# Patient Record
Sex: MALE | Race: WHITE | NOT HISPANIC OR LATINO | Employment: FULL TIME | URBAN - METROPOLITAN AREA
[De-identification: names, ages, dates, MRNs, and addresses within clinical notes are randomized per-mention and may not be internally consistent; named-entity substitution may affect disease eponyms.]

---

## 2018-03-24 ENCOUNTER — APPOINTMENT (OUTPATIENT)
Dept: GENERAL RADIOLOGY | Facility: HOSPITAL | Age: 63
End: 2018-03-24

## 2018-03-24 ENCOUNTER — HOSPITAL ENCOUNTER (EMERGENCY)
Facility: HOSPITAL | Age: 63
Discharge: HOME | End: 2018-03-24
Admitting: NURSE PRACTITIONER

## 2018-03-24 VITALS
HEART RATE: 83 BPM | OXYGEN SATURATION: 97 % | HEIGHT: 70 IN | WEIGHT: 315 LBS | TEMPERATURE: 98.1 F | DIASTOLIC BLOOD PRESSURE: 73 MMHG | BODY MASS INDEX: 45.1 KG/M2 | SYSTOLIC BLOOD PRESSURE: 157 MMHG | RESPIRATION RATE: 18 BRPM

## 2018-03-24 DIAGNOSIS — M25.552 LEFT HIP PAIN: Primary | ICD-10-CM

## 2018-03-24 DIAGNOSIS — M79.18 LEFT BUTTOCK PAIN: ICD-10-CM

## 2018-03-24 PROCEDURE — 73502 X-RAY EXAM HIP UNI 2-3 VIEWS: CPT

## 2018-03-24 PROCEDURE — 99282 EMERGENCY DEPT VISIT SF MDM: CPT

## 2018-03-24 PROCEDURE — 99282 EMERGENCY DEPT VISIT SF MDM: CPT | Performed by: NURSE PRACTITIONER

## 2018-03-24 RX ORDER — FINASTERIDE 5 MG/1
5 TABLET, FILM COATED ORAL DAILY
COMMUNITY

## 2018-03-25 NOTE — ED PROVIDER NOTES
Subjective   History of Present Illness  History of Present Illness    Chief complaint: pain    Location: left hip and buttock    Quality/Severity:  Minimal, rated as a 1    Timing/Duration: today     Modifying Factors: laying on left side     Associated Symptoms: denies shortness of air, denies chest pain    Narrative: 62 year old male presented to the ED today with his wife after slipping on ice and falling down three steps on his buttocks.  He has complaints of left sided hip and left buttock pain.  Denies hitting his head or loss of consciousness.  He reports taking over the counter pain medication prior to arrival and rated his pain as a 1 out of 10.  He is smiling in the room while talking.  Denies back pain, chest pain, abdominal pain, and no shortness of air.      Review of Systems  General: Denies fevers or chills.  Denies any weakness or fatigue.  Denies any weight loss or weight gain.  SKIN: Denies any rashes lesions or ulcers.  Denies color change.  ENT: Denies sore throat or rhinorrhea.  Denies ear pain.    EYES: Denies any blurred vision.  Denies any change in vision.  Denies any photophobia.  Denies any vision loss.  LUNGS: Denies any shortness of breath or wheezing.  Denies any cough.  Denies any hemoptysis.  CARDIAC: Denies any chest pain.  Denies palpitations.  Denies syncope.  Denies any edema  ABD: Denies any abdominal pain.  Denies any nausea or vomiting or diarrhea.  Denies any rectal bleeding.  Denies constipation  : Denies any dysuria, urgency, frequency or hematuria.  Denies discharge.  Denies flank pain.  NEURO: Denies any focal weakness.  Denies headache.  Denies seizures.  Denies changes in speech or difficulty walking.  ENDOCRINE: Denies polydipsia and polyuria  M/S: + left hip and left buttock pain, Denies arthralgias, back pain, myalgias or neck pain  HEME/LYMPH: Negative for adenopathy. Does not bruise/bleed easily.   PSYCH: Negative for suicidal ideas. Denies anxiety or  depression  review was performed in addition to those in the above all other reviews are negative.    Past Medical History:   Diagnosis Date   • Diabetes    • High cholesterol    • Hypertension        Allergies   Allergen Reactions   • Niacin        Past Surgical History:   Procedure Laterality Date   • BACK SURGERY     • HERNIA REPAIR     • KNEE SURGERY     • TONSILLECTOMY         Family History   Problem Relation Age of Onset   • Cancer Mother    • Diabetes Father    • Diabetes Maternal Grandfather        Social History     Social History   • Marital status:      Social History Main Topics   • Smoking status: Never Smoker   • Alcohol use No   • Drug use: Unknown     Other Topics Concern   • Not on file     No current facility-administered medications for this encounter.     Current Outpatient Prescriptions:   •  amitriptyline (ELAVIL) 25 MG tablet, TAKE (1) TABLET AT BEDTIME FOR NEUROPATHY, Disp: , Rfl: 11  •  atorvastatin (LIPITOR) 40 MG tablet, , Disp: , Rfl:   •  dutasteride (AVODART) 0.5 MG capsule, , Disp: , Rfl:   •  finasteride (PROSCAR) 5 MG tablet, Take 5 mg by mouth Daily., Disp: , Rfl:   •  gabapentin (NEURONTIN) 300 MG capsule, , Disp: , Rfl:   •  losartan (COZAAR) 100 MG tablet, TAKE ONE TABLET BY MOUTH EVERY MORNING FOR BLOOD PRESSURE, Disp: , Rfl: 1  •  pioglitazone (ACTOS) 30 MG tablet, TAKE 1 TABLET BY MOUTH ONCE DAILY., Disp: , Rfl: 2  •  BYDUREON 2 MG pen-injector, INJECT 1 SUBCUTANEOUS EVERY WEEK, Disp: , Rfl: 2  •  CLARITIN-D 24 HOUR  MG per 24 hr tablet, TAKE 1 TABLET EVERY DAY, Disp: , Rfl: 5  •  omeprazole (PriLOSEC) 20 MG capsule, TAKE ONE CAPSULE BY MOUTH TWICE DAILY, Disp: , Rfl: 0  •  oxyCODONE-acetaminophen (PERCOCET) 5-325 MG per tablet, TAKE 1-2 TABLETS BY MOUTH EVERY 4 HOURS AS NEEDED FOR PAIN, Disp: , Rfl: 0  •  pantoprazole (PROTONIX) 40 MG EC tablet, TAKE ONE TABLET TWICE DAILY, Disp: , Rfl: 0    Vitals:    03/24/18 1959   BP: 157/73   Pulse: 83   Resp: 18   Temp:  98.1 °F (36.7 °C)   SpO2: 97%           Objective   Physical Exam  General: NAD, alert and oriented x 4  Cardiac: S1, S2, RRR, no murmur, no edema  Pulmonary: CTA bilaterally, no wheezing   Abdomen: soft, non-tender, non-distended  : Voids independently, no CVA tenderness   Musculoskeletal: Moves all extremities, equal strength bilaterally  Neuro: alert and oriented x 3, CN 2 - 12 grossly intact  Skin: warm, dry, and intact.  Ecchymosis noted to left buttock.    Procedures         ED Course  ED Course      X-ray taken of left hip reviewed by myself and Dr. Woo.  No acute findings seen.  Follow up with PCP.  Return to ED if worsens.  Continue to take over the counter pain medication as needed.  Can use ice or heat for comfort.             MDM  Number of Diagnoses or Management Options  Left buttock pain: new and requires workup  Left hip pain: new and requires workup     Amount and/or Complexity of Data Reviewed  Tests in the radiology section of CPT®: reviewed    Risk of Complications, Morbidity, and/or Mortality  Presenting problems: moderate  Diagnostic procedures: moderate  Management options: low    Patient Progress  Patient progress: stable      Final diagnoses:   Left hip pain   Left buttock pain            Kary Schmidt, APRN  03/24/18 2122

## 2020-12-08 ENCOUNTER — TELEPHONE (OUTPATIENT)
Dept: GASTROENTEROLOGY | Facility: CLINIC | Age: 65
End: 2020-12-08

## 2020-12-09 NOTE — TELEPHONE ENCOUNTER
LAST COLONOSCOPY 08/31/2010 - NO HX OR FHX OF COLON CA OR POLYPS - HX OF CONSTIPATION - PLEASE SCHEDULE @ [place] - THANKS

## 2020-12-11 ENCOUNTER — PREP FOR SURGERY (OUTPATIENT)
Dept: OTHER | Facility: HOSPITAL | Age: 65
End: 2020-12-11

## 2020-12-11 DIAGNOSIS — Z12.11 ENCOUNTER FOR SCREENING FOR MALIGNANT NEOPLASM OF COLON: Primary | ICD-10-CM

## 2020-12-14 PROBLEM — Z12.11 ENCOUNTER FOR SCREENING FOR MALIGNANT NEOPLASM OF COLON: Status: ACTIVE | Noted: 2020-12-14

## 2020-12-14 NOTE — TELEPHONE ENCOUNTER
CALLED AND SPOKE WITH PATIENT.  SCHEDULED AT [place] 04/09/2021 AT 10:15AM - ARRIVE 9AM.   WILL MAIL INSTRUCTIONS.    COVID TEST ON 04/07/2021, WILL CALL WITH TIME.  NEED TO SELF QUARANTINE UNTIL AFTER PROCEDURE.  HE UNDERSTANDS.

## 2021-03-24 ENCOUNTER — TELEPHONE (OUTPATIENT)
Dept: GASTROENTEROLOGY | Facility: CLINIC | Age: 66
End: 2021-03-24

## 2021-03-24 NOTE — TELEPHONE ENCOUNTER
PATIENT CALL YESTERDAY, 03/23/2021 AT 3:17PM.  LEFT MESSAGE ON MY VOICE MAIL.  NEEDS TO CANCEL COLONOSCOPY ON 04/09/2021.

## 2021-03-24 NOTE — TELEPHONE ENCOUNTER
CALLED AND SPOKE WITH PATIENT.  HE DOES NOT WANT TO RESCHEDULE AT THIS TIME.  HE WILL CALL WHEN READY.

## 2021-04-07 ENCOUNTER — APPOINTMENT (OUTPATIENT)
Dept: LAB | Facility: HOSPITAL | Age: 66
End: 2021-04-07

## 2024-02-20 ENCOUNTER — HOSPITAL ENCOUNTER (EMERGENCY)
Facility: HOSPITAL | Age: 69
Discharge: HOME | End: 2024-02-20
Attending: EMERGENCY MEDICINE | Admitting: EMERGENCY MEDICINE
Payer: COMMERCIAL

## 2024-02-20 VITALS
HEART RATE: 71 BPM | RESPIRATION RATE: 16 BRPM | HEIGHT: 70 IN | BODY MASS INDEX: 45.1 KG/M2 | SYSTOLIC BLOOD PRESSURE: 143 MMHG | WEIGHT: 315 LBS | OXYGEN SATURATION: 97 % | DIASTOLIC BLOOD PRESSURE: 69 MMHG | TEMPERATURE: 97.7 F

## 2024-02-20 VITALS
TEMPERATURE: 97.5 F | DIASTOLIC BLOOD PRESSURE: 73 MMHG | SYSTOLIC BLOOD PRESSURE: 176 MMHG | WEIGHT: 315 LBS | BODY MASS INDEX: 45.1 KG/M2 | OXYGEN SATURATION: 96 % | HEIGHT: 70 IN | RESPIRATION RATE: 16 BRPM | HEART RATE: 70 BPM

## 2024-02-20 DIAGNOSIS — S01.312A LACERATION OF LEFT EARLOBE, INITIAL ENCOUNTER: Primary | ICD-10-CM

## 2024-02-20 DIAGNOSIS — S01.312A LACERATION OF LEFT EAR, INITIAL ENCOUNTER: Primary | ICD-10-CM

## 2024-02-20 PROCEDURE — 99282 EMERGENCY DEPT VISIT SF MDM: CPT

## 2024-02-20 PROCEDURE — 90715 TDAP VACCINE 7 YRS/> IM: CPT | Performed by: EMERGENCY MEDICINE

## 2024-02-20 PROCEDURE — 25010000002 TETANUS-DIPHTH-ACELL PERTUSSIS 5-2.5-18.5 LF-MCG/0.5 SUSPENSION PREFILLED SYRINGE: Performed by: EMERGENCY MEDICINE

## 2024-02-20 PROCEDURE — 99283 EMERGENCY DEPT VISIT LOW MDM: CPT

## 2024-02-20 PROCEDURE — 90471 IMMUNIZATION ADMIN: CPT | Performed by: EMERGENCY MEDICINE

## 2024-02-20 RX ORDER — CEPHALEXIN 500 MG/1
500 CAPSULE ORAL 3 TIMES DAILY
Qty: 15 CAPSULE | Refills: 0 | Status: SHIPPED | OUTPATIENT
Start: 2024-02-20

## 2024-02-20 RX ORDER — CEPHALEXIN 500 MG/1
500 CAPSULE ORAL ONCE
Status: COMPLETED | OUTPATIENT
Start: 2024-02-20 | End: 2024-02-20

## 2024-02-20 RX ORDER — LIDOCAINE HYDROCHLORIDE 20 MG/ML
10 INJECTION, SOLUTION INFILTRATION; PERINEURAL ONCE
Status: COMPLETED | OUTPATIENT
Start: 2024-02-20 | End: 2024-02-20

## 2024-02-20 RX ADMIN — CEPHALEXIN 500 MG: 500 CAPSULE ORAL at 05:40

## 2024-02-20 RX ADMIN — LIDOCAINE HYDROCHLORIDE 10 ML: 20 INJECTION, SOLUTION INFILTRATION; PERINEURAL at 09:42

## 2024-02-20 RX ADMIN — TETANUS TOXOID, REDUCED DIPHTHERIA TOXOID AND ACELLULAR PERTUSSIS VACCINE, ADSORBED 0.5 ML: 5; 2.5; 8; 8; 2.5 SUSPENSION INTRAMUSCULAR at 05:39

## 2024-02-20 NOTE — ED PROVIDER NOTES
Subjective   History of Present Illness  Patient presents emergency room complaining of laceration to his ear.  Patient fell out of bed last night onto his ear causing a laceration to his left ear.  Patient presented to this emergency room earlier this morning where he was seen and had Dermabond placed to his left ear.  Patient states that when he returned home he started having bleeding from the same laceration and came back to the emergency room.  Patient has no other complaints.  Tetanus is up-to-date.  Patient is not on blood thinners.      Review of Systems   Constitutional:  Negative for activity change, appetite change, chills, diaphoresis, fatigue and fever.   HENT:  Negative for congestion, sinus pressure, sneezing and sore throat.    Eyes:  Negative for photophobia and visual disturbance.   Respiratory:  Negative for cough and shortness of breath.    Cardiovascular:  Negative for chest pain, palpitations and leg swelling.   Gastrointestinal:  Negative for abdominal distention, abdominal pain, diarrhea, nausea and vomiting.   Genitourinary:  Negative for dysuria and flank pain.   Musculoskeletal:  Negative for arthralgias, back pain and myalgias.   Skin:  Positive for wound. Negative for rash.   Neurological:  Negative for dizziness, weakness and headaches.   Psychiatric/Behavioral:  Negative for behavioral problems and confusion.        Past Medical History:   Diagnosis Date    Diabetes     High cholesterol     Hypertension        Allergies   Allergen Reactions    Niacin        Past Surgical History:   Procedure Laterality Date    BACK SURGERY      HERNIA REPAIR      KNEE SURGERY      TONSILLECTOMY         Family History   Problem Relation Age of Onset    Cancer Mother     Diabetes Father     Diabetes Maternal Grandfather        Social History     Socioeconomic History    Marital status:    Tobacco Use    Smoking status: Never   Substance and Sexual Activity    Alcohol use: No           Objective    Physical Exam  Vitals and nursing note reviewed.   Constitutional:       General: He is not in acute distress.     Appearance: Normal appearance. He is not toxic-appearing or diaphoretic.   HENT:      Head: Normocephalic and atraumatic.      Ears:        Comments: Patient has a laceration running from posterior pinna along the line of attachment of his ear to his scalp in the area seen on the diagram but on the posterior surface.  Dermabond was used to close the laceration of the pinna.  However the area of laceration that involved the attachment of the ear to the scalp does not appear to be closed.  Whether this is from failure of the Dermabond or whether Dermabond was not used in this area is hard to discern.       Mouth/Throat:      Mouth: Mucous membranes are moist.   Eyes:      Conjunctiva/sclera: Conjunctivae normal.   Cardiovascular:      Rate and Rhythm: Normal rate.      Pulses: Normal pulses.   Pulmonary:      Effort: Pulmonary effort is normal. No respiratory distress.      Breath sounds: No wheezing or rales.   Musculoskeletal:         General: No swelling or signs of injury. Normal range of motion.      Cervical back: Normal range of motion and neck supple.   Skin:     General: Skin is warm and dry.      Findings: No rash.   Neurological:      General: No focal deficit present.      Mental Status: He is alert.         Laceration Repair    Date/Time: 2/20/2024 9:35 AM    Performed by: Aureliano Coombs MD  Authorized by: Aureliano Coombs MD    Consent:     Consent obtained:  Verbal    Risks discussed:  Infection, pain and need for additional repair  Universal protocol:     Procedure explained and questions answered to patient or proxy's satisfaction: yes      Patient identity confirmed:  Verbally with patient  Laceration details:     Location:  Scalp    Length (cm):  1.5  Pre-procedure details:     Preparation:  Patient was prepped and draped in usual sterile fashion  Treatment:     Area  cleansed with:  Derek    Debridement:  None    Undermining:  None  Skin repair:     Repair method:  Sutures    Suture size:  5-0    Suture material:  Prolene    Suture technique:  Simple interrupted    Number of sutures:  3  Approximation:     Approximation:  Close  Repair type:     Repair type:  Simple  Post-procedure details:     Dressing:  Antibiotic ointment    Procedure completion:  Tolerated well, no immediate complications             ED Course                                             Medical Decision Making  My differential diagnosis includes but is not limited to cerebral contusion, cervical strain, concussion with LOC, concussion without LOC, contusion, fracture of the skull, orbits or mandible, hematoma, intracranial hemorrhage including subdural, epidural, subarachnoid and intracerebral, laceration and postconcussion syndrome     Risk  Prescription drug management.        Final diagnoses:   Laceration of left ear, initial encounter       ED Disposition  ED Disposition       ED Disposition   Discharge    Condition   Stable    Comment   --               Louie Pineda MD  [address] [facility] [address] [phone]    In 1 week  For suture removal    [facility] EMERGENCY DEPARTMENT  [facility] [facility] [id number] [phone]  Go to   As needed         Medication List      No changes were made to your prescriptions during this visit.            Aureliano Coombs MD  02/20/24 0902

## 2024-02-20 NOTE — DISCHARGE INSTRUCTIONS
Wash the wound once a day with soap and water, pat dry.  Do not apply bacitracin or Neosporin ointment as this will dissolve the skin adhesive.  Take Motrin or Tylenol as needed as directed for pain.  Take Keflex as prescribed.  Recheck with Dr. Pineda in 1 week.  Return to emergency department if there is redness, drainage, increased pain, fever, chills, worse anyway at all

## 2024-02-20 NOTE — ED PROVIDER NOTES
Subjective   History of Present Illness    Chief complaint: Your last urine    Location: Left ear    Quality/Severity: Not actively bleeding    Timing/Onset/Duration: Just prior to arrival    Modifying Factors: Nothing makes it better or worse    Associated Symptoms: No loss of consciousness.  No neck or back pain.  No change in hearing.  No nausea or vomiting.    Narrative: This 68-year-old white male presents with laceration to the left ear after falling out of bed.  He had no loss of consciousness.  There is no change in hearing.  The patient is not on blood thinners.    PCP:Louie Pineda MD      Review of Systems      Past Medical History:   Diagnosis Date    Diabetes     High cholesterol     Hypertension        Allergies   Allergen Reactions    Niacin        Past Surgical History:   Procedure Laterality Date    BACK SURGERY      HERNIA REPAIR      KNEE SURGERY      TONSILLECTOMY         Family History   Problem Relation Age of Onset    Cancer Mother     Diabetes Father     Diabetes Maternal Grandfather        Social History     Socioeconomic History    Marital status:    Tobacco Use    Smoking status: Never   Substance and Sexual Activity    Alcohol use: No           Objective   Physical Exam  Vitals (The temperature is 97.7 °F, pulse 76, respirations 16, /79, room air pulse ox 95%.) and nursing note reviewed.   Constitutional:       Appearance: Normal appearance.   HENT:      Left Ear: Tympanic membrane normal.      Ears:      Comments: There is a 1 cm superficial laceration to the left earlobe.  It is not through and through.  The TM is clear.  There is no bleeding or fluid in the ear.  No other tenderness or deformity.  There is a 2 cm laceration on the posterior portion of the pinna.     Nose: Nose normal. No rhinorrhea.      Mouth/Throat:      Mouth: Mucous membranes are moist.   Eyes:      Extraocular Movements: Extraocular movements intact.      Pupils: Pupils are equal, round, and  reactive to light.   Neck:      Comments: There is no tenderness, deformity, or bony step-offs upon palpation the cervical, thoracic, lumbar sacrococcygeal spine.  Neurological:      General: No focal deficit present.      Mental Status: He is alert and oriented to person, place, and time.         Procedures           ED Course      05:27 EST, 02/20/24:  The 1 cm left earlobe laceration and 2 cm laceration was copiously irrigated with normal saline meticulously explored to the base in a bloodless field.  There was no nerve involvement, no tendon involvement, no foreign bodies could be appreciated.  The wound was closed with Dermabond.    05:28 EST, 02/20/24:  The patient received a tetanus shot.  The patient should wash the wound once a day with soap and water, pat dry.  He should not apply bacitracin or Neosporin ointment as this will dissolve the skin adhesive.  Patient should take Motrin or Tylenol as needed as directed for pain.  Patient will be given a dose of Keflex here in the emergency department and prescription for Keflex to minimize risk of infection.  The patient should have the laceration rechecked in 1 week by Dr. Pineda.  Return to the emergency department if there is increased redness, drainage, pain, fever, worse in any way at all.  All patient questions were answered he will be discharged in good condition.                                       Medical Decision Making  Problems Addressed:  Laceration of left earlobe, initial encounter: complicated acute illness or injury    Risk  Prescription drug management.        Final diagnoses:   Laceration of left earlobe, initial encounter       ED Disposition  ED Disposition       None            No follow-up provider specified.       Medication List      No changes were made to your prescriptions during this visit.            Steven Palm MD  02/20/24 0518

## 2025-01-28 ENCOUNTER — TRANSCRIBE ORDERS (OUTPATIENT)
Dept: ULTRASOUND IMAGING | Facility: HOSPITAL | Age: 70
End: 2025-01-28
Payer: COMMERCIAL

## 2025-01-28 DIAGNOSIS — D17.20 BENIGN LIPOMATOUS NEOPLASM OF SKIN AND SUBCUTANEOUS TISSUE OF UNSPECIFIED LIMB: Primary | ICD-10-CM

## 2025-02-07 ENCOUNTER — HOSPITAL ENCOUNTER (OUTPATIENT)
Dept: ULTRASOUND IMAGING | Facility: HOSPITAL | Age: 70
Discharge: HOME | End: 2025-02-07
Admitting: INTERNAL MEDICINE
Payer: COMMERCIAL

## 2025-02-07 DIAGNOSIS — D17.20 BENIGN LIPOMATOUS NEOPLASM OF SKIN AND SUBCUTANEOUS TISSUE OF UNSPECIFIED LIMB: ICD-10-CM

## 2025-02-07 PROCEDURE — 76882 US LMTD JT/FCL EVL NVASC XTR: CPT

## 2025-05-10 ENCOUNTER — HOSPITAL ENCOUNTER (EMERGENCY)
Facility: HOSPITAL | Age: 70
Discharge: HOME | End: 2025-05-10
Payer: COMMERCIAL

## 2025-05-10 ENCOUNTER — APPOINTMENT (OUTPATIENT)
Dept: GENERAL RADIOLOGY | Facility: HOSPITAL | Age: 70
End: 2025-05-10
Payer: COMMERCIAL

## 2025-05-10 VITALS
HEIGHT: 70 IN | OXYGEN SATURATION: 95 % | HEART RATE: 74 BPM | RESPIRATION RATE: 14 BRPM | TEMPERATURE: 98.3 F | SYSTOLIC BLOOD PRESSURE: 137 MMHG | WEIGHT: 315 LBS | BODY MASS INDEX: 45.1 KG/M2 | DIASTOLIC BLOOD PRESSURE: 73 MMHG

## 2025-05-10 DIAGNOSIS — S61.211A LACERATION OF LEFT INDEX FINGER WITHOUT FOREIGN BODY WITHOUT DAMAGE TO NAIL, INITIAL ENCOUNTER: Primary | ICD-10-CM

## 2025-05-10 PROCEDURE — 73140 X-RAY EXAM OF FINGER(S): CPT

## 2025-05-10 PROCEDURE — 99283 EMERGENCY DEPT VISIT LOW MDM: CPT

## 2025-05-10 PROCEDURE — 25010000002 LIDOCAINE PF 1% 1 % SOLUTION

## 2025-05-10 RX ORDER — LIDOCAINE 40 MG/G
1 CREAM TOPICAL ONCE
Status: DISCONTINUED | OUTPATIENT
Start: 2025-05-10 | End: 2025-05-10

## 2025-05-10 RX ORDER — LIDOCAINE HYDROCHLORIDE 10 MG/ML
10 INJECTION, SOLUTION EPIDURAL; INFILTRATION; INTRACAUDAL; PERINEURAL ONCE
Status: COMPLETED | OUTPATIENT
Start: 2025-05-10 | End: 2025-05-10

## 2025-05-10 RX ORDER — ACETAMINOPHEN 500 MG
1000 TABLET ORAL ONCE
Status: COMPLETED | OUTPATIENT
Start: 2025-05-10 | End: 2025-05-10

## 2025-05-10 RX ORDER — IBUPROFEN 400 MG/1
400 TABLET, FILM COATED ORAL ONCE
Status: COMPLETED | OUTPATIENT
Start: 2025-05-10 | End: 2025-05-10

## 2025-05-10 RX ORDER — LIDOCAINE 40 MG/G
1 CREAM TOPICAL ONCE
Status: COMPLETED | OUTPATIENT
Start: 2025-05-10 | End: 2025-05-10

## 2025-05-10 RX ADMIN — LIDOCAINE HYDROCHLORIDE 10 ML: 10 INJECTION, SOLUTION EPIDURAL; INFILTRATION; INTRACAUDAL at 12:18

## 2025-05-10 RX ADMIN — LIDOCAINE 4% 1 APPLICATION: 4 CREAM TOPICAL at 12:18

## 2025-05-10 RX ADMIN — IBUPROFEN 400 MG: 400 TABLET, FILM COATED ORAL at 12:18

## 2025-05-10 RX ADMIN — ACETAMINOPHEN 1000 MG: 500 TABLET, FILM COATED ORAL at 12:18

## 2025-05-10 NOTE — DISCHARGE INSTRUCTIONS
You were seen in the emergency department today for finger laceration. Vital signs + a medical screening exam were performed, and the need for any labwork and/ or imaging were discussed. Results of the above, if performed, were also discussed as well as their clinical significance.     You should schedule a follow-up appointment with your family medicine doctor within the next 2-3 days. If you do not have a family medicine provider we can provide you with contact information to establish care.    You can shower and wash your hands as normal starting later this afternoon/this evening, avoid soaking the hand.  Wound check with your family doctor early next week.  If you are going to do any activities that can lead to exposure to dust or other particles you should cover with something like a Band-Aid.  Fever/chills, pus draining from the wound, inability to move your finger, severe swelling of the finger, redness spreading up or down your arm.    Any prescriptions written today can have a paper copy provided for you to take to any pharmacy you would like in the event that your primary pharmacy is closed.  For any medications that were prescribed as a daily medication and for which you received a dose in the emergency department such as antibiotics, unless otherwise instructed take your 1st dose tomorrow. If you would prefer a paper copy of your prescription, let your treating physician or nurse know.     It is important to remember that symptoms and progression of illness/ injury can change, so do not hesitate to return to the Emergency Department for any new or worsening symptoms.     You should return to the Emergency Department or seek immediate medical care if you develop new or worsening symptoms including but not limited to fever/ chills, redness spreading up or down your arm, pus draining from the wound, inability to move your finger.